# Patient Record
Sex: FEMALE | Race: WHITE | NOT HISPANIC OR LATINO
[De-identification: names, ages, dates, MRNs, and addresses within clinical notes are randomized per-mention and may not be internally consistent; named-entity substitution may affect disease eponyms.]

---

## 2020-08-28 ENCOUNTER — APPOINTMENT (OUTPATIENT)
Dept: ORTHOPEDIC SURGERY | Facility: CLINIC | Age: 75
End: 2020-08-28
Payer: MEDICARE

## 2020-08-28 DIAGNOSIS — Z78.9 OTHER SPECIFIED HEALTH STATUS: ICD-10-CM

## 2020-08-28 DIAGNOSIS — Z80.9 FAMILY HISTORY OF MALIGNANT NEOPLASM, UNSPECIFIED: ICD-10-CM

## 2020-08-28 DIAGNOSIS — M19.042 PRIMARY OSTEOARTHRITIS, LEFT HAND: ICD-10-CM

## 2020-08-28 DIAGNOSIS — M65.332 TRIGGER FINGER, LEFT MIDDLE FINGER: ICD-10-CM

## 2020-08-28 PROCEDURE — 99203 OFFICE O/P NEW LOW 30 MIN: CPT

## 2020-08-28 PROCEDURE — 73130 X-RAY EXAM OF HAND: CPT | Mod: LT

## 2020-08-28 RX ORDER — LEVOTHYROXINE, LIOTHYRONINE 76; 18 UG/1; UG/1
TABLET ORAL
Refills: 0 | Status: ACTIVE | COMMUNITY

## 2020-08-28 NOTE — HISTORY OF PRESENT ILLNESS
[Right] : right hand dominant [FreeTextEntry1] : She comes in today for evaluation of a left hand pain, which began 1 week ago. She reports that she noted ecchymosis along the knuckle of her left index finger, but cannot cite any injury or trauma. Currently she describes pain along the base of the fingers of her left hand, worse at the PIP joint of the left index finger. She has also developed lumps on the left thumb, index finger, little finger and palm. Her symptoms are exacerbated with use of the hand. She denies any clicking of her fingers. She rates her pain a 4-5 out of 10 at this time. \par \par She is status post left index finger surgery for a laceration 20 years ago.

## 2020-08-28 NOTE — PHYSICAL EXAM
[de-identified] : - Constitutional: This is a female in no obvious distress.  \par - Psych: Patient is alert and oriented to person, place and time.  Patient has a normal mood and affect.\par - Cardiovascular: Normal pulses throughout the upper extremities.  No significant varicosities are noted in the upper extremities. \par - Neuro: Strength and sensation are intact throughout the upper extremities.  Patient has normal coordination.\par - Respiratory:  Patient exhibits no evidence of shortness of breath or difficulty breathing.\par - Skin: No rashes, lesions, or other abnormalities are noted in the upper extremities.\par \par ---\par  \par Examination of her left hand demonstrates mild ecchymosis at the index finger dorsally.  There is pain with terminal flexion.  There is a well-healed scar where she had previous surgery from a laceration in the past overlying the MCP joint.  She has mild tenderness along the dorsal aspect of the PIP joint and proximal phalanx.  There is no evidence of a trigger finger.  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  She also has mild swelling with associated tenderness along the A1 pulley of the middle finger without triggering.  She is neurovascularly intact distally. [de-identified] : AP, lateral oblique radiographs of her left hand demonstrate arthritis at the DIP joints of the digits, most notably at the little finger.  She also has what appears to be a bone infarct/island along the ulnar aspect of the index finger distal phalanx.  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

## 2020-08-28 NOTE — ADDENDUM
[FreeTextEntry1] : I, Darlin San, acted solely as a scribe for Dr. Almazan on this date 08/28/2020.

## 2020-08-28 NOTE — END OF VISIT
[FreeTextEntry3] : This note was written by Darlin San on 08/28/2020 acting solely as a scribe for Dr. Edward Almazan.\par  \par All medical record entries made by the Scribe were at my, Dr. Edward Almazan, direction and personally dictated by me on 08/28/2020. I have personally reviewed the chart and agree that the record accurately reflects my personal performance of the history, physical exam, assessment and plan.

## 2020-08-28 NOTE — DISCUSSION/SUMMARY
[FreeTextEntry1] : She has findings consistent with resolving left index finger swelling and ecchymosis for the past week, possibly secondary to an old injury.  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 At this time, I recommended treatment with ice and antiinflammatories. We discussed possible cortisone injection for the left middle finger flexor tendinitis/trigger finger, but she deferred as she is not having nay significant pain or discomfort. She will return to the office if her symptoms worsen in the future. \par \par The patient has agreed to this plan of management and has expressed full understanding.  All questions were fully answered to the patient's satisfaction.\par \par Over 50% of the time spent with the patient was on counseling the patient on the above diagnosis, treatment plan and prognosis.

## 2023-07-31 ENCOUNTER — INPATIENT (INPATIENT)
Facility: HOSPITAL | Age: 78
LOS: 1 days | Discharge: HOME | End: 2023-08-02
Attending: INTERNAL MEDICINE | Admitting: INTERNAL MEDICINE
Payer: MEDICARE

## 2023-07-31 VITALS
TEMPERATURE: 98 F | HEART RATE: 87 BPM | RESPIRATION RATE: 17 BRPM | OXYGEN SATURATION: 99 % | DIASTOLIC BLOOD PRESSURE: 94 MMHG | HEIGHT: 65 IN | SYSTOLIC BLOOD PRESSURE: 178 MMHG | WEIGHT: 205.03 LBS

## 2023-07-31 DIAGNOSIS — I16.0 HYPERTENSIVE URGENCY: ICD-10-CM

## 2023-07-31 DIAGNOSIS — R42 DIZZINESS AND GIDDINESS: ICD-10-CM

## 2023-07-31 DIAGNOSIS — Z98.890 OTHER SPECIFIED POSTPROCEDURAL STATES: Chronic | ICD-10-CM

## 2023-07-31 DIAGNOSIS — E03.9 HYPOTHYROIDISM, UNSPECIFIED: ICD-10-CM

## 2023-07-31 LAB
ALBUMIN SERPL ELPH-MCNC: 3.4 G/DL — SIGNIFICANT CHANGE UP (ref 3.3–5)
ALP SERPL-CCNC: 79 U/L — SIGNIFICANT CHANGE UP (ref 40–120)
ALT FLD-CCNC: 15 U/L — SIGNIFICANT CHANGE UP (ref 12–78)
ANION GAP SERPL CALC-SCNC: 5 MMOL/L — SIGNIFICANT CHANGE UP (ref 5–17)
AST SERPL-CCNC: 13 U/L — LOW (ref 15–37)
BASOPHILS # BLD AUTO: 0.05 K/UL — SIGNIFICANT CHANGE UP (ref 0–0.2)
BASOPHILS NFR BLD AUTO: 0.6 % — SIGNIFICANT CHANGE UP (ref 0–2)
BILIRUB SERPL-MCNC: 0.4 MG/DL — SIGNIFICANT CHANGE UP (ref 0.2–1.2)
BUN SERPL-MCNC: 18 MG/DL — SIGNIFICANT CHANGE UP (ref 7–23)
CALCIUM SERPL-MCNC: 8.4 MG/DL — LOW (ref 8.5–10.1)
CHLORIDE SERPL-SCNC: 108 MMOL/L — SIGNIFICANT CHANGE UP (ref 96–108)
CO2 SERPL-SCNC: 28 MMOL/L — SIGNIFICANT CHANGE UP (ref 22–31)
CREAT SERPL-MCNC: 0.96 MG/DL — SIGNIFICANT CHANGE UP (ref 0.5–1.3)
EGFR: 61 ML/MIN/1.73M2 — SIGNIFICANT CHANGE UP
EOSINOPHIL # BLD AUTO: 0.06 K/UL — SIGNIFICANT CHANGE UP (ref 0–0.5)
EOSINOPHIL NFR BLD AUTO: 0.7 % — SIGNIFICANT CHANGE UP (ref 0–6)
GLUCOSE SERPL-MCNC: 91 MG/DL — SIGNIFICANT CHANGE UP (ref 70–99)
HCT VFR BLD CALC: 42.1 % — SIGNIFICANT CHANGE UP (ref 34.5–45)
HGB BLD-MCNC: 13.8 G/DL — SIGNIFICANT CHANGE UP (ref 11.5–15.5)
IMM GRANULOCYTES NFR BLD AUTO: 0.4 % — SIGNIFICANT CHANGE UP (ref 0–0.9)
LYMPHOCYTES # BLD AUTO: 1.55 K/UL — SIGNIFICANT CHANGE UP (ref 1–3.3)
LYMPHOCYTES # BLD AUTO: 18.8 % — SIGNIFICANT CHANGE UP (ref 13–44)
MCHC RBC-ENTMCNC: 29.1 PG — SIGNIFICANT CHANGE UP (ref 27–34)
MCHC RBC-ENTMCNC: 32.8 G/DL — SIGNIFICANT CHANGE UP (ref 32–36)
MCV RBC AUTO: 88.6 FL — SIGNIFICANT CHANGE UP (ref 80–100)
MONOCYTES # BLD AUTO: 0.51 K/UL — SIGNIFICANT CHANGE UP (ref 0–0.9)
MONOCYTES NFR BLD AUTO: 6.2 % — SIGNIFICANT CHANGE UP (ref 2–14)
NEUTROPHILS # BLD AUTO: 6.04 K/UL — SIGNIFICANT CHANGE UP (ref 1.8–7.4)
NEUTROPHILS NFR BLD AUTO: 73.3 % — SIGNIFICANT CHANGE UP (ref 43–77)
NRBC # BLD: 0 /100 WBCS — SIGNIFICANT CHANGE UP (ref 0–0)
PLATELET # BLD AUTO: 198 K/UL — SIGNIFICANT CHANGE UP (ref 150–400)
POTASSIUM SERPL-MCNC: 3.5 MMOL/L — SIGNIFICANT CHANGE UP (ref 3.5–5.3)
POTASSIUM SERPL-SCNC: 3.5 MMOL/L — SIGNIFICANT CHANGE UP (ref 3.5–5.3)
PROT SERPL-MCNC: 7.1 GM/DL — SIGNIFICANT CHANGE UP (ref 6–8.3)
RBC # BLD: 4.75 M/UL — SIGNIFICANT CHANGE UP (ref 3.8–5.2)
RBC # FLD: 13.3 % — SIGNIFICANT CHANGE UP (ref 10.3–14.5)
SODIUM SERPL-SCNC: 141 MMOL/L — SIGNIFICANT CHANGE UP (ref 135–145)
TROPONIN I, HIGH SENSITIVITY RESULT: 6.2 NG/L — SIGNIFICANT CHANGE UP
TSH SERPL-MCNC: 1.21 UIU/ML — SIGNIFICANT CHANGE UP (ref 0.36–3.74)
WBC # BLD: 8.24 K/UL — SIGNIFICANT CHANGE UP (ref 3.8–10.5)
WBC # FLD AUTO: 8.24 K/UL — SIGNIFICANT CHANGE UP (ref 3.8–10.5)

## 2023-07-31 PROCEDURE — 70450 CT HEAD/BRAIN W/O DYE: CPT | Mod: 26,MA

## 2023-07-31 PROCEDURE — 99285 EMERGENCY DEPT VISIT HI MDM: CPT | Mod: FS

## 2023-07-31 PROCEDURE — 93010 ELECTROCARDIOGRAM REPORT: CPT

## 2023-07-31 PROCEDURE — 99223 1ST HOSP IP/OBS HIGH 75: CPT

## 2023-07-31 PROCEDURE — 71045 X-RAY EXAM CHEST 1 VIEW: CPT | Mod: 26

## 2023-07-31 RX ORDER — ONDANSETRON 8 MG/1
4 TABLET, FILM COATED ORAL EVERY 8 HOURS
Refills: 0 | Status: DISCONTINUED | OUTPATIENT
Start: 2023-07-31 | End: 2023-08-02

## 2023-07-31 RX ORDER — HYDRALAZINE HCL 50 MG
10 TABLET ORAL ONCE
Refills: 0 | Status: COMPLETED | OUTPATIENT
Start: 2023-07-31 | End: 2023-07-31

## 2023-07-31 RX ORDER — MECLIZINE HCL 12.5 MG
25 TABLET ORAL EVERY 6 HOURS
Refills: 0 | Status: DISCONTINUED | OUTPATIENT
Start: 2023-07-31 | End: 2023-08-02

## 2023-07-31 RX ORDER — HYDRALAZINE HCL 50 MG
10 TABLET ORAL EVERY 8 HOURS
Refills: 0 | Status: DISCONTINUED | OUTPATIENT
Start: 2023-07-31 | End: 2023-08-01

## 2023-07-31 RX ORDER — HYDRALAZINE HCL 50 MG
5 TABLET ORAL ONCE
Refills: 0 | Status: COMPLETED | OUTPATIENT
Start: 2023-07-31 | End: 2023-07-31

## 2023-07-31 RX ORDER — ACETAMINOPHEN 500 MG
975 TABLET ORAL ONCE
Refills: 0 | Status: COMPLETED | OUTPATIENT
Start: 2023-07-31 | End: 2023-07-31

## 2023-07-31 RX ORDER — MECLIZINE HCL 12.5 MG
25 TABLET ORAL ONCE
Refills: 0 | Status: COMPLETED | OUTPATIENT
Start: 2023-07-31 | End: 2023-07-31

## 2023-07-31 RX ORDER — LEVOTHYROXINE SODIUM 125 MCG
100 TABLET ORAL DAILY
Refills: 0 | Status: DISCONTINUED | OUTPATIENT
Start: 2023-07-31 | End: 2023-08-02

## 2023-07-31 RX ORDER — SODIUM CHLORIDE 9 MG/ML
1000 INJECTION INTRAMUSCULAR; INTRAVENOUS; SUBCUTANEOUS ONCE
Refills: 0 | Status: COMPLETED | OUTPATIENT
Start: 2023-07-31 | End: 2023-07-31

## 2023-07-31 RX ORDER — METOCLOPRAMIDE HCL 10 MG
10 TABLET ORAL ONCE
Refills: 0 | Status: COMPLETED | OUTPATIENT
Start: 2023-07-31 | End: 2023-07-31

## 2023-07-31 RX ORDER — ACETAMINOPHEN 500 MG
650 TABLET ORAL EVERY 6 HOURS
Refills: 0 | Status: DISCONTINUED | OUTPATIENT
Start: 2023-07-31 | End: 2023-08-02

## 2023-07-31 RX ORDER — LANOLIN ALCOHOL/MO/W.PET/CERES
3 CREAM (GRAM) TOPICAL AT BEDTIME
Refills: 0 | Status: DISCONTINUED | OUTPATIENT
Start: 2023-07-31 | End: 2023-08-02

## 2023-07-31 RX ADMIN — Medication 975 MILLIGRAM(S): at 19:36

## 2023-07-31 RX ADMIN — Medication 5 MILLIGRAM(S): at 20:17

## 2023-07-31 RX ADMIN — Medication 975 MILLIGRAM(S): at 20:05

## 2023-07-31 RX ADMIN — SODIUM CHLORIDE 1000 MILLILITER(S): 9 INJECTION INTRAMUSCULAR; INTRAVENOUS; SUBCUTANEOUS at 20:05

## 2023-07-31 RX ADMIN — SODIUM CHLORIDE 2000 MILLILITER(S): 9 INJECTION INTRAMUSCULAR; INTRAVENOUS; SUBCUTANEOUS at 19:35

## 2023-07-31 RX ADMIN — Medication 10 MILLIGRAM(S): at 21:14

## 2023-07-31 RX ADMIN — Medication 25 MILLIGRAM(S): at 19:36

## 2023-07-31 NOTE — H&P ADULT - HISTORY OF PRESENT ILLNESS
78 year old female with a PMH Hypothyroidism presents to ED with a 2 day history of dizziness, headache describes  as " feels like a balloon", neck pain that has gotten worst today. Endorses nausea, denies Vomiting.  No recent head trauma/injury or falls. Denies Vison changes, extremity weakness numbness,   fever, chills, chest pain, shortness of breath, abdominal pain, diarrhea, dysuria, sick contact. In the ED patient was found to be in hypertensive urgency with SBP>200, responded to hydralazine. Labs unremarkable.   CT-Head: Age-appropriate involutional changes. No acute intracranial pathology. If dizziness persists, consider further evaluation via MR imaging to include DWI and ADC mapping techniques, provided there are no contraindications.

## 2023-07-31 NOTE — ED ADULT NURSE NOTE - NSFALLRISKINTERV_ED_ALL_ED

## 2023-07-31 NOTE — ED ADULT TRIAGE NOTE - CHIEF COMPLAINT QUOTE
BIBA from home for dizziness, headache and hypertension, 180/90, no hx of htn, patient states has been feeling lousy x 2 days, states head feels like a bowling ball, eyes feels weird

## 2023-07-31 NOTE — ED PROVIDER NOTE - CLINICAL SUMMARY MEDICAL DECISION MAKING FREE TEXT BOX
78F with PMH Hypothyroidism who presents to ED with room-spinning sensation/dizziness and headache x 2 days. Patient currently afebrile, hemodynamically stable, spO2 100%. Based on history and physical, differentials include but are not limited to . Plan to assess patient for acute pathology as listed above with . Will administer medications for symptomatic relief, follow-up on results, and reassess. 78F with PMH Hypothyroidism who presents to ED with room-spinning sensation/dizziness and headache x 2 days. Patient currently afebrile, hemodynamically stable but hypertensive, spO2 100%. Based on history and physical, differentials include but are not limited to viral illness, electrolyte abnormality, anemia, ACS, ICH. Plan to assess patient for acute pathology as listed above with labs, EKG, CXR CT Head. Will administer medications for symptomatic relief, follow-up on results, and reassess.

## 2023-07-31 NOTE — ED ADULT NURSE REASSESSMENT NOTE - NS ED NURSE REASSESS COMMENT FT1
Pt says she feels cold and shaky after hydralazine, says she has reactions to most medications. VS within normal range, endorsed to Dr. Gomez. Cardiac and SPO2 monitoring in place. Continuing to monitor closely.

## 2023-07-31 NOTE — H&P ADULT - ASSESSMENT
78 year old female with a PMH Hypothyroidism presents to ED with a 2 day history of dizziness, headache describes  as " feels like a balloon", neck pain that has gotten worst today. Endorses nausea, denies Vomiting.  No recent head trauma/injury or falls. Denies Vison changes, extremity weakness numbness,   fever, chills, chest pain, shortness of breath, abdominal pain, diarrhea, dysuria, sick contact. In the ED patient was found to be in hypertensive urgency with SBP>200, responded to hydralazine. Labs unremarkable.   CT-Head: Age-appropriate involutional changes. No acute intracranial pathology. If dizziness persists, consider further evaluation via MR imaging to include DWI and ADC mapping techniques, provided there are no contraindications.  Patient is being admitted for new onset hypertensive urgency

## 2023-07-31 NOTE — H&P ADULT - NSHPPHYSICALEXAM_GEN_ALL_CORE
Constitutional:  Well developed, well nourished, no acute distress .    Eyes:  normal conjunctiva.   Neck:  no carotid bruit, No JVD  Cardiac: RRR,  no murmur, no rub, no gallop,   Pulmonary:  clear lung fields, good air entry, no respiratory distress.   Abdomen:  +BS, soft, non-tender, no masses/organomegaly, normal bowel sounds.   Extremities:  no cyanosis, no clubbing, no varicosities,   Skin:  no rash, no skin lesions.   Neurological:  moves all extremities, no focal deficits, normal speech.   Psychiatric:  alert and oriented, normal memory.

## 2023-07-31 NOTE — ED ADULT NURSE NOTE - OBJECTIVE STATEMENT
Pt aaox4, bibems from home c/o headache, room-spinning sensation x2 days. Pt also c/o sys bp in the 190s over the past few days which she believes is causing her headache. Pt denies trauma/injury. Pt denies cp, sob, blurry vision, fever/chills, cough, congestion, n/v/d/c, abdominal pain, back pain, flank pain, urinary s/s, numbness/tingling, weakness. Stat ct head completed. Respirations even and unlabored. No acute distress noted at this time.

## 2023-07-31 NOTE — ED PROVIDER NOTE - OBJECTIVE STATEMENT
78F with PMH Hypothyroidism who presents to ED with room-spinning sensation/dizziness and headache x 2 days. No recent head trauma/injury or falls. Denies fever, chills, chest pain, shortness of breath, abdominal pain, N/V/D, dysuria, urinary frequency/urgency, extremity weakness/numbness/tingling, vision changes, or neck pain/stiffness.

## 2023-07-31 NOTE — H&P ADULT - NSHPLABSRESULTS_GEN_ALL_CORE
13.8   8.24  )-----------( 198      ( 31 Jul 2023 19:53 )             42.1     07-31    141  |  108  |  18  ----------------------------<  91  3.5   |  28  |  0.96    Ca    8.4<L>      31 Jul 2023 19:53    TPro  7.1  /  Alb  3.4  /  TBili  0.4  /  DBili  x   /  AST  13<L>  /  ALT  15  /  AlkPhos  79  07-31      Urinalysis Basic - ( 31 Jul 2023 19:53 )    Color: x / Appearance: x / SG: x / pH: x  Gluc: 91 mg/dL / Ketone: x  / Bili: x / Urobili: x   Blood: x / Protein: x / Nitrite: x   Leuk Esterase: x / RBC: x / WBC x   Sq Epi: x / Non Sq Epi: x / Bacteria: x              CT-Head: Age-appropriate involutional changes. No acute intracranial pathology. If dizziness persists, consider further evaluation via MR imaging to include DWI and ADC mapping techniques, provided there are no contraindications.

## 2023-07-31 NOTE — H&P ADULT - PROBLEM SELECTOR PLAN 2
2 days history of dizziness & headache   CT-Head: Age-appropriate involutional changes. No acute intracranial pathology. If dizziness persists, consider further evaluation via MR imaging to include DWI and ADC mapping techniques, provided there are no contraindications.  - Meclizine  - Tylenol  - Monitor

## 2023-07-31 NOTE — H&P ADULT - PROBLEM SELECTOR PLAN 1
New onset Hypertension   Upon ED arrival / 90 then increased to SBP >200  Received Hydralazine 5mg + 10mg   Patient is reluctant to medications - endorses she "gets reaction" to all medication   - Telemetry   - Hydralazine as needed to manage BP   - Cardio consult   - ECG  - Orthostatic BP   - Monitor BP

## 2023-07-31 NOTE — ED ADULT NURSE REASSESSMENT NOTE - NS ED NURSE REASSESS COMMENT FT1
Spoke with pt's  Edward Saleh (([phone]), advised him of plan of care, requests to be notified with updates.

## 2023-08-01 LAB
A1C WITH ESTIMATED AVERAGE GLUCOSE RESULT: 5.6 % — SIGNIFICANT CHANGE UP (ref 4–5.6)
ALBUMIN SERPL ELPH-MCNC: 3.2 G/DL — LOW (ref 3.3–5)
ALP SERPL-CCNC: 80 U/L — SIGNIFICANT CHANGE UP (ref 40–120)
ALT FLD-CCNC: 13 U/L — SIGNIFICANT CHANGE UP (ref 12–78)
ANION GAP SERPL CALC-SCNC: 7 MMOL/L — SIGNIFICANT CHANGE UP (ref 5–17)
AST SERPL-CCNC: 12 U/L — LOW (ref 15–37)
BASOPHILS # BLD AUTO: 0.04 K/UL — SIGNIFICANT CHANGE UP (ref 0–0.2)
BASOPHILS NFR BLD AUTO: 0.4 % — SIGNIFICANT CHANGE UP (ref 0–2)
BILIRUB SERPL-MCNC: 0.5 MG/DL — SIGNIFICANT CHANGE UP (ref 0.2–1.2)
BUN SERPL-MCNC: 14 MG/DL — SIGNIFICANT CHANGE UP (ref 7–23)
CALCIUM SERPL-MCNC: 7.9 MG/DL — LOW (ref 8.5–10.1)
CHLORIDE SERPL-SCNC: 111 MMOL/L — HIGH (ref 96–108)
CHOLEST SERPL-MCNC: 180 MG/DL — SIGNIFICANT CHANGE UP
CO2 SERPL-SCNC: 22 MMOL/L — SIGNIFICANT CHANGE UP (ref 22–31)
CREAT SERPL-MCNC: 0.71 MG/DL — SIGNIFICANT CHANGE UP (ref 0.5–1.3)
EGFR: 88 ML/MIN/1.73M2 — SIGNIFICANT CHANGE UP
EOSINOPHIL # BLD AUTO: 0 K/UL — SIGNIFICANT CHANGE UP (ref 0–0.5)
EOSINOPHIL NFR BLD AUTO: 0 % — SIGNIFICANT CHANGE UP (ref 0–6)
ESTIMATED AVERAGE GLUCOSE: 114 MG/DL — SIGNIFICANT CHANGE UP (ref 68–114)
GLUCOSE SERPL-MCNC: 113 MG/DL — HIGH (ref 70–99)
HCT VFR BLD CALC: 41.5 % — SIGNIFICANT CHANGE UP (ref 34.5–45)
HCV AB S/CO SERPL IA: 0.12 S/CO — SIGNIFICANT CHANGE UP (ref 0–0.99)
HCV AB SERPL-IMP: SIGNIFICANT CHANGE UP
HDLC SERPL-MCNC: 71 MG/DL — SIGNIFICANT CHANGE UP
HGB BLD-MCNC: 13.7 G/DL — SIGNIFICANT CHANGE UP (ref 11.5–15.5)
IMM GRANULOCYTES NFR BLD AUTO: 0.4 % — SIGNIFICANT CHANGE UP (ref 0–0.9)
LIPID PNL WITH DIRECT LDL SERPL: 98 MG/DL — SIGNIFICANT CHANGE UP
LYMPHOCYTES # BLD AUTO: 1.15 K/UL — SIGNIFICANT CHANGE UP (ref 1–3.3)
LYMPHOCYTES # BLD AUTO: 11.3 % — LOW (ref 13–44)
MCHC RBC-ENTMCNC: 29.1 PG — SIGNIFICANT CHANGE UP (ref 27–34)
MCHC RBC-ENTMCNC: 33 G/DL — SIGNIFICANT CHANGE UP (ref 32–36)
MCV RBC AUTO: 88.1 FL — SIGNIFICANT CHANGE UP (ref 80–100)
MONOCYTES # BLD AUTO: 0.33 K/UL — SIGNIFICANT CHANGE UP (ref 0–0.9)
MONOCYTES NFR BLD AUTO: 3.2 % — SIGNIFICANT CHANGE UP (ref 2–14)
NEUTROPHILS # BLD AUTO: 8.66 K/UL — HIGH (ref 1.8–7.4)
NEUTROPHILS NFR BLD AUTO: 84.7 % — HIGH (ref 43–77)
NON HDL CHOLESTEROL: 109 MG/DL — SIGNIFICANT CHANGE UP
NRBC # BLD: 0 /100 WBCS — SIGNIFICANT CHANGE UP (ref 0–0)
PLATELET # BLD AUTO: 200 K/UL — SIGNIFICANT CHANGE UP (ref 150–400)
POTASSIUM SERPL-MCNC: 3.7 MMOL/L — SIGNIFICANT CHANGE UP (ref 3.5–5.3)
POTASSIUM SERPL-SCNC: 3.7 MMOL/L — SIGNIFICANT CHANGE UP (ref 3.5–5.3)
PROT SERPL-MCNC: 6.9 GM/DL — SIGNIFICANT CHANGE UP (ref 6–8.3)
RBC # BLD: 4.71 M/UL — SIGNIFICANT CHANGE UP (ref 3.8–5.2)
RBC # FLD: 13.3 % — SIGNIFICANT CHANGE UP (ref 10.3–14.5)
SODIUM SERPL-SCNC: 140 MMOL/L — SIGNIFICANT CHANGE UP (ref 135–145)
T4 AB SER-ACNC: 10.3 UG/DL — SIGNIFICANT CHANGE UP (ref 4.6–12)
TRIGL SERPL-MCNC: 58 MG/DL — SIGNIFICANT CHANGE UP
WBC # BLD: 10.22 K/UL — SIGNIFICANT CHANGE UP (ref 3.8–10.5)
WBC # FLD AUTO: 10.22 K/UL — SIGNIFICANT CHANGE UP (ref 3.8–10.5)

## 2023-08-01 PROCEDURE — 99232 SBSQ HOSP IP/OBS MODERATE 35: CPT

## 2023-08-01 PROCEDURE — 93010 ELECTROCARDIOGRAM REPORT: CPT

## 2023-08-01 PROCEDURE — 99223 1ST HOSP IP/OBS HIGH 75: CPT

## 2023-08-01 RX ORDER — HYDRALAZINE HCL 50 MG
5 TABLET ORAL ONCE
Refills: 0 | Status: COMPLETED | OUTPATIENT
Start: 2023-08-01 | End: 2023-08-01

## 2023-08-01 RX ORDER — LOSARTAN POTASSIUM 100 MG/1
25 TABLET, FILM COATED ORAL DAILY
Refills: 0 | Status: DISCONTINUED | OUTPATIENT
Start: 2023-08-01 | End: 2023-08-02

## 2023-08-01 RX ORDER — IBUPROFEN 200 MG
400 TABLET ORAL THREE TIMES A DAY
Refills: 0 | Status: DISCONTINUED | OUTPATIENT
Start: 2023-08-01 | End: 2023-08-02

## 2023-08-01 RX ADMIN — Medication 5 MILLIGRAM(S): at 01:06

## 2023-08-01 RX ADMIN — Medication 400 MILLIGRAM(S): at 11:15

## 2023-08-01 RX ADMIN — Medication 10 MILLIGRAM(S): at 06:01

## 2023-08-01 RX ADMIN — Medication 400 MILLIGRAM(S): at 12:15

## 2023-08-01 RX ADMIN — Medication 100 MICROGRAM(S): at 06:00

## 2023-08-01 NOTE — PROGRESS NOTE ADULT - PROBLEM SELECTOR PLAN 3
Chronic - resent Thyroid function WNL   - Synthroid 100mcg    Dispo: in AM pending monitoring of BP Chronic - resent Thyroid function WNL   - Synthroid 100mcg    Dispo: in AM pending monitoring of BP and TTE

## 2023-08-01 NOTE — PROGRESS NOTE ADULT - PROBLEM SELECTOR PLAN 2
2 days history of dizziness & headache   CT-Head: Age-appropriate involutional changes. No acute intracranial pathology. If dizziness persists, consider further evaluation via MR imaging to include DWI and ADC mapping techniques, provided there are no contraindications.  - Meclizine  - Tylenol  - Add Ibuprofen PRN  - Monitor 2 days history of dizziness & headache   CT-Head: Age-appropriate involutional changes. No acute intracranial pathology. If dizziness persists, consider further evaluation via MR imaging to include DWI and ADC mapping techniques, provided there are no contraindications.  - Meclizine  - Tylenol  - Add Ibuprofen PRN  - Monitor  - TTE

## 2023-08-01 NOTE — PROGRESS NOTE ADULT - PROBLEM SELECTOR PLAN 1
New onset Hypertension   Upon ED arrival / 90 then increased to SBP >200  Received Hydralazine 5mg + 10mg   Patient is reluctant to medications - endorses she "gets reaction" to all medication   - Telemetry   - Hydralazine as needed to manage BP   - ECG without hypertrophy  - Orthostatic BP   - Monitor BP New onset Hypertension   Upon ED arrival / 90 then increased to SBP >200  Received Hydralazine 5mg + 10mg   Patient is reluctant to medications - endorses she "gets reaction" to all medication   - Telemetry   - Hydralazine as needed to manage BP, BP now controlled off meds, ?anxiety driven   - ECG without hypertrophy  - TTE  - Orthostatic BP   - Monitor BP New onset Hypertension   Upon ED arrival / 90 then increased to SBP >200  Received Hydralazine 5mg + 10mg   Patient is reluctant to medications - endorses she "gets reaction" to all medication and refused PO BP meds this AM  - Telemetry   - Hydralazine as needed to manage BP, BP now controlled off meds, ?anxiety driven   - Cards  - ECG without hypertrophy  - TTE  - Orthostatic BP   - Monitor BP

## 2023-08-01 NOTE — CONSULT NOTE ADULT - ASSESSMENT
78 year old female with a PMH Hypothyroidism; presented to ED with a 2 day history of dizziness, HA, nausea.  In the ED patient was found to be hypertensive with SBP>200, responded to hydralazine.   Labs unremarkable.   CT-Head: Age-appropriate involutional changes. No acute intracranial pathology.   No hx of HTN... usually 110-120s.  Similar presentation a few years ago... normal stress test/echo; ?stress related.  EKG: sinus 64bpm  On hydralazine 10mg IV q8    -monitor on tele  -?why still on IV medications... would d/c  -will start losartan  -2D echo pending

## 2023-08-01 NOTE — PATIENT PROFILE ADULT - FALL HARM RISK - RISK INTERVENTIONS
Assistance OOB with selected safe patient handling equipment/Assistance with ambulation/Communicate Fall Risk and Risk Factors to all staff, patient, and family/Monitor gait and stability/Reinforce activity limits and safety measures with patient and family/Sit up slowly, dangle for a short time, stand at bedside before walking/Use of alarms - bed, chair and/or voice tab/Visual Cue: Yellow wristband/Bed in lowest position, wheels locked, appropriate side rails in place/Call bell, personal items and telephone in reach/Instruct patient to call for assistance before getting out of bed or chair/Non-slip footwear when patient is out of bed/[place] to call system/Physically safe environment - no spills, clutter or unnecessary equipment/Purposeful Proactive Rounding/Room/bathroom lighting operational, light cord in reach

## 2023-08-01 NOTE — PROGRESS NOTE ADULT - SUBJECTIVE AND OBJECTIVE BOX
Zabrina Ricci M.D.    Patient is a 78y old  Female who presents with a chief complaint of Hypertensive Urgency (31 Jul 2023 22:54)      SUBJECTIVE / OVERNIGHT EVENTS: still have some headache behind the eyes. No other concerns. No h/o elevated BP. But dose reports some anxiety as she is due for eye surgery later this month.     Patient denies chest pain, SOB, abd pain, N/V, fever, chills, dysuria or any other complaints. All remainder ROS negative.     MEDICATIONS  (STANDING):  hydrALAZINE Injectable 10 milliGRAM(s) IV Push every 8 hours  levothyroxine 100 MICROGram(s) Oral daily    MEDICATIONS  (PRN):  acetaminophen     Tablet .. 650 milliGRAM(s) Oral every 6 hours PRN Temp greater or equal to 38C (100.4F), Mild Pain (1 - 3)  ibuprofen  Tablet. 400 milliGRAM(s) Oral three times a day PRN Mild Pain (1 - 3), Moderate Pain (4 - 6)  meclizine 25 milliGRAM(s) Oral every 6 hours PRN Dizziness  melatonin 3 milliGRAM(s) Oral at bedtime PRN Insomnia  ondansetron Injectable 4 milliGRAM(s) IV Push every 8 hours PRN Nausea and/or Vomiting      I&O's Summary      PHYSICAL EXAM:  Vital Signs Last 24 Hrs  T(C): 36.4 (01 Aug 2023 10:50), Max: 36.9 (31 Jul 2023 19:01)  T(F): 97.6 (01 Aug 2023 10:50), Max: 98.4 (31 Jul 2023 19:01)  HR: 81 (01 Aug 2023 10:50) (63 - 90)  BP: 138/83 (01 Aug 2023 10:50) (136/81 - 214/84)  BP(mean): --  RR: 17 (01 Aug 2023 10:50) (11 - 18)  SpO2: 100% (01 Aug 2023 10:50) (98% - 100%)    Parameters below as of 01 Aug 2023 10:50  Patient On (Oxygen Delivery Method): room air    CONSTITUTIONAL: NAD, well-groomed  RESPIRATORY: Normal respiratory effort; lungs are clear to auscultation bilaterally  CARDIOVASCULAR: Regular rate and rhythm; No lower extremity edema  ABDOMEN: Nontender to palpation, normoactive bowel sounds  PSYCH: A+O x3; affect appropriate  NEUROLOGY: CN 2-12 are intact and symmetric; no gross sensory deficits;   SKIN: No rashes; no palpable lesions    LABS:                        13.7   10.22 )-----------( 200      ( 01 Aug 2023 06:26 )             41.5     08-01    140  |  111<H>  |  14  ----------------------------<  113<H>  3.7   |  22  |  0.71    Ca    7.9<L>      01 Aug 2023 06:26    TPro  6.9  /  Alb  3.2<L>  /  TBili  0.5  /  DBili  x   /  AST  12<L>  /  ALT  13  /  AlkPhos  80  08-01          Urinalysis Basic - ( 01 Aug 2023 06:26 )    Color: x / Appearance: x / SG: x / pH: x  Gluc: 113 mg/dL / Ketone: x  / Bili: x / Urobili: x   Blood: x / Protein: x / Nitrite: x   Leuk Esterase: x / RBC: x / WBC x   Sq Epi: x / Non Sq Epi: x / Bacteria: x        CAPILLARY BLOOD GLUCOSE          RADIOLOGY & ADDITIONAL TESTS:  Results Reviewed:   Imaging Personally Reviewed:  Electrocardiogram Personally Reviewed:

## 2023-08-01 NOTE — CONSULT NOTE ADULT - SUBJECTIVE AND OBJECTIVE BOX
CARDIOLOGY CONSULT NOTE    Patient is a 78y Female with a known history of :  Hypertensive urgency [I16.0]    Dizziness [R42]    Hypothyroidism [E03.9]      HPI:  78 year old female with a PMH Hypothyroidism; presented to ED with a 2 day history of dizziness, HA, nausea.  In the ED patient was found to be hypertensive with SBP>200, responded to hydralazine.   Labs unremarkable.   CT-Head: Age-appropriate involutional changes. No acute intracranial pathology.   No hx of HTN... usually 110-120s.  Similar presentation a few years ago... normal stress test/echo; ?stress related.  EKG: sinus 64bpm      REVIEW OF SYSTEMS:  CONSTITUTIONAL: No fever, weight loss, or fatigue  EYES: No eye pain, visual disturbances, or discharge  ENMT:  No difficulty hearing, tinnitus, vertigo; No sinus or throat pain  NECK: No pain or stiffness  BREASTS: No pain, masses, or nipple discharge  RESPIRATORY: No cough, wheezing, chills or hemoptysis; No shortness of breath  CARDIOVASCULAR: No chest pain, palpitations, dizziness, or leg swelling  GASTROINTESTINAL: No abdominal or epigastric pain. No nausea, vomiting, or hematemesis; No diarrhea or constipation. No melena or hematochezia.  GENITOURINARY: No dysuria, frequency, hematuria, or incontinence  NEUROLOGICAL: No headaches, memory loss, loss of strength, numbness, or tremors  SKIN: No itching, burning, rashes, or lesions   LYMPH NODES: No enlarged glands  ENDOCRINE: No heat or cold intolerance; No hair loss  MUSCULOSKELETAL: No joint pain or swelling; No muscle, back, or extremity pain  PSYCHIATRIC: No depression, anxiety, mood swings, or difficulty sleeping  HEME/LYMPH: No easy bruising, or bleeding gums  ALLERGY AND IMMUNOLOGIC: No hives or eczema    MEDICATIONS  (STANDING):  hydrALAZINE Injectable 10 milliGRAM(s) IV Push every 8 hours  levothyroxine 100 MICROGram(s) Oral daily    MEDICATIONS  (PRN):  acetaminophen     Tablet .. 650 milliGRAM(s) Oral every 6 hours PRN Temp greater or equal to 38C (100.4F), Mild Pain (1 - 3)  ibuprofen  Tablet. 400 milliGRAM(s) Oral three times a day PRN Mild Pain (1 - 3), Moderate Pain (4 - 6)  meclizine 25 milliGRAM(s) Oral every 6 hours PRN Dizziness  melatonin 3 milliGRAM(s) Oral at bedtime PRN Insomnia  ondansetron Injectable 4 milliGRAM(s) IV Push every 8 hours PRN Nausea and/or Vomiting      ALLERGIES: Ceclor (Hives)      FAMILY HISTORY:      PHYSICAL EXAMINATION:  -----------------------------  T(C): 36.4 (08-01-23 @ 10:50), Max: 36.9 (07-31-23 @ 19:01)  HR: 88 (08-01-23 @ 14:52) (63 - 90)  BP: 108/69 (08-01-23 @ 14:52) (108/69 - 214/84)  RR: 17 (08-01-23 @ 10:50) (11 - 18)  SpO2: 100% (08-01-23 @ 10:50) (98% - 100%)      Constitutional: well developed, normal appearance, well groomed, well nourished, no deformities and no acute distress.   Eyes: the conjunctiva exhibited no abnormalities and the eyelids demonstrated no xanthelasmas.   HEENT: normal oral mucosa, no oral pallor and no oral cyanosis.   Neck: normal jugular venous A waves present, normal jugular venous V waves present and no jugular venous bruno A waves.   Pulmonary: no respiratory distress, normal respiratory rhythm and effort, no accessory muscle use and lungs were clear to auscultation bilaterally.   Cardiovascular: heart rate and rhythm were normal, normal S1 and S2 and no murmur, gallop, rub, heave or thrill are present.   Abdomen: soft, non-tender, no hepato-splenomegaly and no abdominal mass palpated.   Musculoskeletal: the gait could not be assessed..   Extremities: no clubbing of the fingernails, no localized cyanosis, no petechial hemorrhages and no ischemic changes.   Skin: normal skin color and pigmentation, no rash, no venous stasis, no skin lesions, no skin ulcer and no xanthoma was observed.   Psychiatric: oriented to person, place, and time, the affect was normal, the mood was normal and not feeling anxious.       LABS:   --------  08-01    140  |  111<H>  |  14  ----------------------------<  113<H>  3.7   |  22  |  0.71    Ca    7.9<L>      01 Aug 2023 06:26    TPro  6.9  /  Alb  3.2<L>  /  TBili  0.5  /  DBili  x   /  AST  12<L>  /  ALT  13  /  AlkPhos  80  08-01                         13.7   10.22 )-----------( 200      ( 01 Aug 2023 06:26 )             41.5           180 mg/dL, --, 71 mg/dL, 58 mg/dL

## 2023-08-02 ENCOUNTER — TRANSCRIPTION ENCOUNTER (OUTPATIENT)
Age: 78
End: 2023-08-02

## 2023-08-02 VITALS
TEMPERATURE: 98 F | SYSTOLIC BLOOD PRESSURE: 119 MMHG | OXYGEN SATURATION: 96 % | RESPIRATION RATE: 17 BRPM | HEART RATE: 83 BPM | DIASTOLIC BLOOD PRESSURE: 77 MMHG

## 2023-08-02 PROCEDURE — 99239 HOSP IP/OBS DSCHRG MGMT >30: CPT

## 2023-08-02 PROCEDURE — 99233 SBSQ HOSP IP/OBS HIGH 50: CPT

## 2023-08-02 RX ORDER — LEVOTHYROXINE SODIUM 125 MCG
1 TABLET ORAL
Qty: 0 | Refills: 0 | DISCHARGE
Start: 2023-08-02

## 2023-08-02 RX ORDER — CALCITRIOL 0.5 UG/1
1 CAPSULE ORAL
Refills: 0 | DISCHARGE

## 2023-08-02 RX ORDER — LOSARTAN POTASSIUM 100 MG/1
1 TABLET, FILM COATED ORAL
Qty: 30 | Refills: 0
Start: 2023-08-02 | End: 2023-08-31

## 2023-08-02 RX ORDER — LEVOTHYROXINE SODIUM 125 MCG
1 TABLET ORAL
Refills: 0 | DISCHARGE

## 2023-08-02 RX ORDER — CALCIUM CARBONATE 500(1250)
1 TABLET ORAL
Refills: 0 | DISCHARGE

## 2023-08-02 RX ADMIN — Medication 100 MICROGRAM(S): at 05:35

## 2023-08-02 RX ADMIN — LOSARTAN POTASSIUM 25 MILLIGRAM(S): 100 TABLET, FILM COATED ORAL at 05:35

## 2023-08-02 NOTE — DISCHARGE NOTE PROVIDER - NSDCCPCAREPLAN_GEN_ALL_CORE_FT
PRINCIPAL DISCHARGE DIAGNOSIS  Diagnosis: Hypertensive urgency  Assessment and Plan of Treatment: start losartan 25 daily  low salt diet  follow up with your primary doctor in the next 2 weeks     PRINCIPAL DISCHARGE DIAGNOSIS  Diagnosis: Hypertensive urgency  Assessment and Plan of Treatment: start losartan 25 daily  low salt diet  follow up with your primary doctor in the next 2 weeks      SECONDARY DISCHARGE DIAGNOSES  Diagnosis: Dizziness  Assessment and Plan of Treatment:     Diagnosis: Hypertensive urgency  Assessment and Plan of Treatment:

## 2023-08-02 NOTE — PROGRESS NOTE ADULT - SUBJECTIVE AND OBJECTIVE BOX
Patient: ARIANA CHÁVEZ [id number] 78y Female                            Hospitalist Attending Note   at bedside  Denies complaints, reports to be at baseline.    ____________________PHYSICAL EXAM:  GENERAL:  NAD Alert and Oriented x 3   HEENT: NCAT  CARDIOVASCULAR:  S1, S2  LUNGS: CTAB  ABDOMEN:  soft, (-) tenderness, (-) distension, (+) bowel sounds, (-) guarding, (-) rebound (-) rigidity  EXTREMITIES:  no cyanosis / clubbing / edema.   ____________________     VITALS:  Vital Signs Last 24 Hrs  T(C): 36.7 (02 Aug 2023 11:05), Max: 36.7 (01 Aug 2023 16:49)  T(F): 98.1 (02 Aug 2023 11:05), Max: 98.1 (01 Aug 2023 16:49)  HR: 83 (02 Aug 2023 11:05) (71 - 86)  BP: 119/77 (02 Aug 2023 11:05) (116/75 - 138/82)  BP(mean): --  RR: 17 (02 Aug 2023 11:05) (17 - 17)  SpO2: 96% (02 Aug 2023 11:05) (96% - 100%)    Parameters below as of 02 Aug 2023 11:05  Patient On (Oxygen Delivery Method): room air     Daily     Daily   CAPILLARY BLOOD GLUCOSE        I&O's Summary    01 Aug 2023 07:01  -  02 Aug 2023 07:00  --------------------------------------------------------  IN: 480 mL / OUT: 0 mL / NET: 480 mL        HISTORY:  PAST MEDICAL & SURGICAL HISTORY:  Hypothyroidism      History of thyroid surgery      Allergies    Ceclor (Hives)    Intolerances       LABS:                        13.7   10.22 )-----------( 200      ( 01 Aug 2023 06:26 )             41.5     08-01    140  |  111<H>  |  14  ----------------------------<  113<H>  3.7   |  22  |  0.71    Ca    7.9<L>      01 Aug 2023 06:26    TPro  6.9  /  Alb  3.2<L>  /  TBili  0.5  /  DBili  x   /  AST  12<L>  /  ALT  13  /  AlkPhos  80  08-01      LIVER FUNCTIONS - ( 01 Aug 2023 06:26 )  Alb: 3.2 g/dL / Pro: 6.9 gm/dL / ALK PHOS: 80 U/L / ALT: 13 U/L / AST: 12 U/L / GGT: x           Urinalysis Basic - ( 01 Aug 2023 06:26 )    Color: x / Appearance: x / SG: x / pH: x  Gluc: 113 mg/dL / Ketone: x  / Bili: x / Urobili: x   Blood: x / Protein: x / Nitrite: x   Leuk Esterase: x / RBC: x / WBC x   Sq Epi: x / Non Sq Epi: x / Bacteria: x              MEDICATIONS:  MEDICATIONS  (STANDING):  levothyroxine 100 MICROGram(s) Oral daily  losartan 25 milliGRAM(s) Oral daily    MEDICATIONS  (PRN):  acetaminophen     Tablet .. 650 milliGRAM(s) Oral every 6 hours PRN Temp greater or equal to 38C (100.4F), Mild Pain (1 - 3)  ibuprofen  Tablet. 400 milliGRAM(s) Oral three times a day PRN Mild Pain (1 - 3), Moderate Pain (4 - 6)  meclizine 25 milliGRAM(s) Oral every 6 hours PRN Dizziness  melatonin 3 milliGRAM(s) Oral at bedtime PRN Insomnia  ondansetron Injectable 4 milliGRAM(s) IV Push every 8 hours PRN Nausea and/or Vomiting

## 2023-08-02 NOTE — DISCHARGE NOTE PROVIDER - HOSPITAL COURSE
HPI Objective Statement: 78F with PMH Hypothyroidism who presents to ED with room-spinning sensation/dizziness and headache x 2 days noted with levated blood presuree in the er sbp>200 pt received hydralazine and was started on losartan 25 day , transthoracic echo performed and Summary:   1. Normal global left ventricular systolic function.   2. Trace mitral and aortic valve regurgitation.   3. Thickening and calcification of the posterior mitral valve leaflet.  cardiology was consulted and pt was started /comntinued on losartan 25 and cleared for discharge 78 year old female with a PMH Hypothyroidism presents to ED with a 2 day history of dizziness, headache, found to be in hypertensive urgency with SBP>200, responded to hydralazine.  Labs unremarkable. CT-Head: Age-appropriate involutional changes. No acute intracranial pathology. BP now well controlled, seen by cardiology.  TTE with normal systolic function.     # Hypertensive urgency - BP now controlled.  Continue Losartan.  Discussed f/u with PMD for further titration as outpatient.  Now asymptomatic.  # Dizziness - CT head unremarkable.  Symptoms have resolved.    # Acquired Hypothyroidism - continue Synthroid.     Stable for d/c home.   Pt and  in agreement.     Disposition: Stable for discharge.  Outpatient followup discussed.  Total time spent on discharge is  35  minutes.

## 2023-08-02 NOTE — DISCHARGE NOTE NURSING/CASE MANAGEMENT/SOCIAL WORK - PATIENT PORTAL LINK FT
You can access the FollowMyHealth Patient Portal offered by [facility] by registering at the following website: [email or web address]. By joining [employer]’s FollowMyHealth portal, you will also be able to view your health information using other applications (apps) compatible with our system.

## 2023-08-02 NOTE — PROGRESS NOTE ADULT - ASSESSMENT
78 year old female with a PMH Hypothyroidism presents to ED with a 2 day history of dizziness, headache, found to be in hypertensive urgency with SBP>200, responded to hydralazine.  Labs unremarkable. CT-Head: Age-appropriate involutional changes. No acute intracranial pathology. BP now well controlled, seen by cardiology.  TTE with normal systolic function.     # Hypertensive urgency - BP now controlled.  Continue Losartan.  Discussed f/u with PMD for further titration as outpatient.  Now asymptomatic.  # Dizziness - CT head unremarkable.  Symptoms have resolved.    # Acquired Hypothyroidism - continue Synthroid.     Stable for d/c home.   Pt and  in agreement.

## 2023-08-02 NOTE — DISCHARGE NOTE PROVIDER - CARE PROVIDER_API CALL
Landry Sultnaa  Internal Medicine  [address], [address]  [place] [id number]  Phone: [phone]  Fax: [phone]  Follow Up Time:

## 2023-08-02 NOTE — PROGRESS NOTE ADULT - SUBJECTIVE AND OBJECTIVE BOX
Patient is a 78y old Female who presents with a chief complaint of dizziness and headache.     PAST MEDICAL & SURGICAL HISTORY:  Hypothyroidism    History of thyroid surgery    INTERVAL HISTORY: Patient is resting comfortably, in no acute distress. Pt is anxious to go home, she reports her symptoms have resolved. Denies headache, dizziness, chest pain, SOB.   	  MEDICATIONS:  MEDICATIONS  (STANDING):  levothyroxine 100 MICROGram(s) Oral daily  losartan 25 milliGRAM(s) Oral daily    MEDICATIONS  (PRN):  acetaminophen     Tablet .. 650 milliGRAM(s) Oral every 6 hours PRN Temp greater or equal to 38C (100.4F), Mild Pain (1 - 3)  ibuprofen  Tablet. 400 milliGRAM(s) Oral three times a day PRN Mild Pain (1 - 3), Moderate Pain (4 - 6)  meclizine 25 milliGRAM(s) Oral every 6 hours PRN Dizziness  melatonin 3 milliGRAM(s) Oral at bedtime PRN Insomnia  ondansetron Injectable 4 milliGRAM(s) IV Push every 8 hours PRN Nausea and/or Vomiting    Vitals:  T(F): 98.1 (08-02-23 @ 11:05), Max: 98.1 (08-01-23 @ 16:49)  HR: 83 (08-02-23 @ 11:05) (71 - 88)  BP: 119/77 (08-02-23 @ 11:05) (108/69 - 138/82)  RR: 17 (08-02-23 @ 11:05) (17 - 17)  SpO2: 96% (08-02-23 @ 11:05) (96% - 100%)    08-01 @ 07:01  -  08-02 @ 07:00  --------------------------------------------------------  IN:    Oral Fluid: 480 mL  Total IN: 480 mL    OUT:  Total OUT: 0 mL    Total NET: 480 mL    Weight (kg): 93 (07-31 @ 16:30)  BMI (kg/m2): 34.1 (07-31 @ 16:30)    PHYSICAL EXAM:  Neuro: Awake, responsive  CV: S1 S2 RRR  Lungs: CTABL  GI: Soft, BS +, ND, NT  Extremities: No edema    RADIOLOGY:   < from: Xray Chest 1 View- PORTABLE-Urgent (Xray Chest 1 View- PORTABLE-Urgent .) (07.31.23 @ 19:01) >  INTERPRETATION:    Heart size and the mediastinum cannot be accurately evaluated on this   projection.  There are low lung volumes.  No focal lung consolidation, pleural effusion, or pneumothorax is seen.  There is osteoarthritic degenerative change of the spine.    IMPRESSION:  Low lung volumes.  No focal lung consolidation.    < end of copied text >    < from: CT Head No Cont (07.31.23 @ 18:48) >  FINDINGS:  Ventricles and sulci: Parenchymal volume loss is present which is   commensurate with patient age.  Intra-axial: No intracranial mass, acute hemorrhage, or midline shift is   present.  Extra-axial: No extra-axial fluid collection is identified.  Calvarium: Intact. Deposition of calcified plaques in association with   the distal intracranial bilateral vertebral arteries and bilateral   carotid siphons.    Bilateral optic globes are intact. Evidence of prior left paranasal sinus   surgery. Bilateral ethmoid air cell mild mucosal thickening. No paranasal   sinus air-fluid levels. Bilateral mastoid air cells and middle ear   cavities are clear. Paranasal sinuses, bilateral mastoid air cells,   middle ear cavities are clear.    IMPRESSION: Age-appropriate involutional changes. No acute intracranial   pathology. If dizziness persists, consider further evaluation via MR   imaging to include DWI and ADC mapping techniques, provided there are no   contraindications.    < end of copied text >    DIAGNOSTIC TESTING:    [p] Echocardiogram:     LABS:    CARDIAC MARKERS:  Troponin I, High Sensitivity Result: 6.2 ng/L (07-31 @ 19:53)    01 Aug 2023 06:26    140    |  111    |  14     ----------------------------<  113    3.7     |  22     |  0.71   31 Jul 2023 19:53    141    |  108    |  18     ----------------------------<  91     3.5     |  28     |  0.96     Ca    7.9        01 Aug 2023 06:26    TPro  6.9    /  Alb  3.2    /  TBili  0.5    /  DBili  x      /  AST  12     /  ALT  13     /  AlkPhos  80     01 Aug 2023 06:26                       13.7   10.22 )-----------( 200      ( 01 Aug 2023 06:26 )             41.5 ,                       13.8   8.24  )-----------( 198      ( 31 Jul 2023 19:53 )             42.1   Lipid Profile: 08-01 @ 06:26  HDL Chol:              71 mg/dL  Serum Chol:            180 mg/dL  Triglycerides:         58 mg/dL    TSH: Thyroid Stimulating Hormone, Serum: 1.210 uIU/mL (07-31 @ 19:53)               Patient is a 78y old Female who presents with a chief complaint of dizziness and headache.     PAST MEDICAL & SURGICAL HISTORY:  Hypothyroidism    History of thyroid surgery    INTERVAL HISTORY: Patient is resting comfortably, in no acute distress. Pt is anxious to go home, she reports her symptoms have resolved. Denies headache, dizziness, chest pain, SOB.   	  MEDICATIONS:  MEDICATIONS  (STANDING):  levothyroxine 100 MICROGram(s) Oral daily  losartan 25 milliGRAM(s) Oral daily    MEDICATIONS  (PRN):  acetaminophen     Tablet .. 650 milliGRAM(s) Oral every 6 hours PRN Temp greater or equal to 38C (100.4F), Mild Pain (1 - 3)  ibuprofen  Tablet. 400 milliGRAM(s) Oral three times a day PRN Mild Pain (1 - 3), Moderate Pain (4 - 6)  meclizine 25 milliGRAM(s) Oral every 6 hours PRN Dizziness  melatonin 3 milliGRAM(s) Oral at bedtime PRN Insomnia  ondansetron Injectable 4 milliGRAM(s) IV Push every 8 hours PRN Nausea and/or Vomiting    Vitals:  T(F): 98.1 (08-02-23 @ 11:05), Max: 98.1 (08-01-23 @ 16:49)  HR: 83 (08-02-23 @ 11:05) (71 - 88)  BP: 119/77 (08-02-23 @ 11:05) (108/69 - 138/82)  RR: 17 (08-02-23 @ 11:05) (17 - 17)  SpO2: 96% (08-02-23 @ 11:05) (96% - 100%)    08-01 @ 07:01  -  08-02 @ 07:00  --------------------------------------------------------  IN:    Oral Fluid: 480 mL  Total IN: 480 mL    OUT:  Total OUT: 0 mL    Total NET: 480 mL    Weight (kg): 93 (07-31 @ 16:30)  BMI (kg/m2): 34.1 (07-31 @ 16:30)    PHYSICAL EXAM:  Neuro: Awake, responsive  CV: S1 S2 RRR  Lungs: CTABL  GI: Soft, BS +, ND, NT  Extremities: No edema    RADIOLOGY:   < from: Xray Chest 1 View- PORTABLE-Urgent (Xray Chest 1 View- PORTABLE-Urgent .) (07.31.23 @ 19:01) >  INTERPRETATION:    Heart size and the mediastinum cannot be accurately evaluated on this   projection.  There are low lung volumes.  No focal lung consolidation, pleural effusion, or pneumothorax is seen.  There is osteoarthritic degenerative change of the spine.    IMPRESSION:  Low lung volumes.  No focal lung consolidation.    < end of copied text >    < from: CT Head No Cont (07.31.23 @ 18:48) >  FINDINGS:  Ventricles and sulci: Parenchymal volume loss is present which is   commensurate with patient age.  Intra-axial: No intracranial mass, acute hemorrhage, or midline shift is   present.  Extra-axial: No extra-axial fluid collection is identified.  Calvarium: Intact. Deposition of calcified plaques in association with   the distal intracranial bilateral vertebral arteries and bilateral   carotid siphons.    Bilateral optic globes are intact. Evidence of prior left paranasal sinus   surgery. Bilateral ethmoid air cell mild mucosal thickening. No paranasal   sinus air-fluid levels. Bilateral mastoid air cells and middle ear   cavities are clear. Paranasal sinuses, bilateral mastoid air cells,   middle ear cavities are clear.    IMPRESSION: Age-appropriate involutional changes. No acute intracranial   pathology. If dizziness persists, consider further evaluation via MR   imaging to include DWI and ADC mapping techniques, provided there are no   contraindications.    < end of copied text >    DIAGNOSTIC TESTING:    [x] Echocardiogram: < from: TTE Echo Complete w/o Contrast w/ Doppler (08.01.23 @ 17:00) >  Left Ventricle: Normal left ventricular size and wall thicknesses, with   normal systolic and diastolic function.  Global LV systolic function was normal.  Right Ventricle: Normal right ventricular size and function.  Left Atrium: The left atrium is normal in size.  Right Atrium: The right atrium is normal in size.  Pericardium: There is no evidence of pericardial effusion.  Mitral Valve: Structurally normal mitral valve, with normal leaflet   excursion. Thickening and calcification of the posterior mitral valve   leaflet. Trace mitral valve regurgitation is seen.  Tricuspid Valve: Structurally normal tricuspid valve, with normal leaflet   excursion. No tricuspid regurgitation is visualized.  Aortic Valve: Normal trileaflet aortic valve with normal opening. Trivial   aortic valve regurgitation is seen.  Pulmonic Valve: The pulmonic valve was not well visualized. No indication   of pulmonic valve regurgitation.  Aorta: The aortic root and ascending aorta are structurally normal, with   no evidence of dilitation.  Pulmonary Artery: The main pulmonary artery is normal in size.      Summary:   1. Normal global left ventricular systolic function.   2. Trace mitral and aortic valve regurgitation.   3. Thickening and calcification of the posterior mitral valve leaflet.    < end of copied text >    LABS:    CARDIAC MARKERS:  Troponin I, High Sensitivity Result: 6.2 ng/L (07-31 @ 19:53)    01 Aug 2023 06:26    140    |  111    |  14     ----------------------------<  113    3.7     |  22     |  0.71   31 Jul 2023 19:53    141    |  108    |  18     ----------------------------<  91     3.5     |  28     |  0.96     Ca    7.9        01 Aug 2023 06:26    TPro  6.9    /  Alb  3.2    /  TBili  0.5    /  DBili  x      /  AST  12     /  ALT  13     /  AlkPhos  80     01 Aug 2023 06:26                       13.7   10.22 )-----------( 200      ( 01 Aug 2023 06:26 )             41.5 ,                       13.8   8.24  )-----------( 198      ( 31 Jul 2023 19:53 )             42.1     Lipid Profile: 08-01 @ 06:26  HDL Chol:              71 mg/dL  Serum Chol:            180 mg/dL  Triglycerides:         58 mg/dL    TSH: Thyroid Stimulating Hormone, Serum: 1.210 uIU/mL (07-31 @ 19:53)

## 2023-08-02 NOTE — DISCHARGE NOTE PROVIDER - NSDCFUADDINST_GEN_ALL_CORE_FT
It is important to see your primary physician as well as any specialty physicians within the next week to perform a comprehensive medical review.  Call their offices for an appointment as soon as you leave the hospital.  You will also need to see them for renewal of your medications.  If have any difficulty following with a physician, contact the [facility] [phone] or via [email or web address].   To obtain your results, you can access the [email or web address] Patient Portal at [email or web address].  Your medical issues appear to be stable at this time, but if your symptoms recur or worsen, contact your physicians and/or return to the hospital if necessary.  If you encounter any issues or questions with your medication, call your physicians before stopping the medication.  Do not drive.  Limit your diet to 2 grams of sodium daily.

## 2023-08-02 NOTE — PROGRESS NOTE ADULT - ASSESSMENT
78 year old female with a PMH Hypothyroidism; presented to ED with a 2 day history of dizziness, HA, nausea.  In the ED patient was found to be hypertensive with SBP >200, responded to hydralazine.   Labs unremarkable.   CT-Head: Age-appropriate involutional changes. No acute intracranial pathology.   No hx of HTN... usually 110-120s.  Similar presentation a few years ago... normal stress test/echo; ?stress related.  EKG: sinus 64bpm  On hydralazine 10mg IV q8    -cont losartan 25mg daily   -2D echo pending  -can d/c tele if echo is normal  78 year old female with a PMH Hypothyroidism; presented to ED with a 2 day history of dizziness, HA, nausea.  In the ED patient was found to be hypertensive with SBP >200, responded to hydralazine.   Labs unremarkable.   CT-Head: Age-appropriate involutional changes. No acute intracranial pathology.   No hx of HTN, usually 110-120s.  Similar presentation a few years ago, normal stress test/echo; ?stress related.  EKG: sinus 64bpm    -cont losartan 25mg daily, BP stable now  -2D echo unremarkable  -can be discharged from cardiac standpoint, outpatient cardiology follow up within 1-2 weeks    98

## 2023-08-02 NOTE — DISCHARGE NOTE PROVIDER - NSDCMRMEDTOKEN_GEN_ALL_CORE_FT
calcium carbonate 500 mg (200 mg elemental calcium) oral tablet, chewable: 1 tab(s) chewed once a day  levothyroxine 100 mcg (0.1 mg) oral tablet: 1 tab(s) orally once a day  losartan 25 mg oral tablet: 1 tab(s) orally once a day  Rocaltrol 0.25 mcg oral capsule: 1 cap(s) orally once a day

## 2023-08-04 DIAGNOSIS — E03.9 HYPOTHYROIDISM, UNSPECIFIED: ICD-10-CM

## 2023-08-04 DIAGNOSIS — R42 DIZZINESS AND GIDDINESS: ICD-10-CM

## 2023-08-04 DIAGNOSIS — I16.0 HYPERTENSIVE URGENCY: ICD-10-CM

## 2023-08-04 DIAGNOSIS — Z79.890 HORMONE REPLACEMENT THERAPY: ICD-10-CM

## 2023-08-04 DIAGNOSIS — Z88.1 ALLERGY STATUS TO OTHER ANTIBIOTIC AGENTS STATUS: ICD-10-CM

## 2023-08-04 DIAGNOSIS — I10 ESSENTIAL (PRIMARY) HYPERTENSION: ICD-10-CM

## 2024-01-12 NOTE — PATIENT PROFILE ADULT - ARRIVAL FROM
Patient reports history of elevated platelets and white blood cells. She had unremarkable workup per her recounting by Rheumatology and Hematology. Will monitor levels in labs as ordered.   Home

## 2024-05-26 ENCOUNTER — EMERGENCY (EMERGENCY)
Facility: HOSPITAL | Age: 79
LOS: 0 days | Discharge: HOME | End: 2024-05-26
Attending: STUDENT IN AN ORGANIZED HEALTH CARE EDUCATION/TRAINING PROGRAM
Payer: MEDICARE

## 2024-05-26 VITALS
WEIGHT: 210.1 LBS | HEART RATE: 71 BPM | DIASTOLIC BLOOD PRESSURE: 73 MMHG | OXYGEN SATURATION: 98 % | HEIGHT: 65 IN | SYSTOLIC BLOOD PRESSURE: 128 MMHG | RESPIRATION RATE: 16 BRPM | TEMPERATURE: 98 F

## 2024-05-26 VITALS
RESPIRATION RATE: 18 BRPM | DIASTOLIC BLOOD PRESSURE: 81 MMHG | HEART RATE: 62 BPM | SYSTOLIC BLOOD PRESSURE: 154 MMHG | OXYGEN SATURATION: 98 % | TEMPERATURE: 97 F

## 2024-05-26 DIAGNOSIS — M79.641 PAIN IN RIGHT HAND: ICD-10-CM

## 2024-05-26 DIAGNOSIS — Z23 ENCOUNTER FOR IMMUNIZATION: ICD-10-CM
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
## 2024-05-26 DIAGNOSIS — Z98.890 OTHER SPECIFIED POSTPROCEDURAL STATES: Chronic | ICD-10-CM

## 2024-05-26 DIAGNOSIS — M25.531 PAIN IN RIGHT WRIST: ICD-10-CM
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
## 2024-05-26 PROCEDURE — 73090 X-RAY EXAM OF FOREARM: CPT | Mod: 26,RT

## 2024-05-26 PROCEDURE — 99284 EMERGENCY DEPT VISIT MOD MDM: CPT | Mod: 25

## 2024-05-26 PROCEDURE — 73130 X-RAY EXAM OF HAND: CPT | Mod: 26,RT

## 2024-05-26 PROCEDURE — 73110 X-RAY EXAM OF WRIST: CPT | Mod: 26,RT

## 2024-05-26 PROCEDURE — 29125 APPL SHORT ARM SPLINT STATIC: CPT | Mod: RT

## 2024-05-26 PROCEDURE — 73564 X-RAY EXAM KNEE 4 OR MORE: CPT | Mod: 26,LT

## 2024-05-26 RX ORDER — TETANUS TOXOID, REDUCED DIPHTHERIA TOXOID AND ACELLULAR PERTUSSIS VACCINE, ADSORBED 5; 2.5; 8; 8; 2.5 [IU]/.5ML; [IU]/.5ML; UG/.5ML; UG/.5ML; UG/.5ML
0.5 SUSPENSION INTRAMUSCULAR ONCE
Refills: 0 | Status: COMPLETED | OUTPATIENT
Start: 2024-05-26 | End: 2024-05-26

## 2024-05-26 RX ADMIN — TETANUS TOXOID, REDUCED DIPHTHERIA TOXOID AND ACELLULAR PERTUSSIS VACCINE, ADSORBED 0.5 MILLILITER(S): 5; 2.5; 8; 8; 2.5 SUSPENSION INTRAMUSCULAR at 17:26

## 2024-05-26 NOTE — ED PROVIDER NOTE - NSFOLLOWUPINSTRUCTIONS_ED_ALL_ED_FT
keep splint on until you are seen by orthopedic doctor  can take ibuprofen over the counter for pain as needed.     return if symptoms worsen, numbness/tingling, fever or chills, changes in sensation/color of fingertips

## 2024-05-26 NOTE — ED PROVIDER NOTE - OBJECTIVE STATEMENT
78 y/o F presents w/ R hand/wrist pain s/p fall. states she tripped over uneven concrete outside and landed onto R hand. endorsing pain on palmar aspect of the R hand/wrist. unknown tetanus status. denies striking head/neck. endorsing hitting L knee but able to ambulate.

## 2024-05-26 NOTE — ED PROVIDER NOTE - CARE PROVIDER_API CALL
Shana Babb  Orthopaedic Surgery  [address], [address]  [place] [address]  Phone: [phone]  Fax: [phone]  Follow Up Time: 4-6 Days    Ramón Rose  Plastic Surgery  [address], [address]  [place] [address]  Phone: [phone]  Fax: [phone]  Follow Up Time: 4-6 Days

## 2024-05-26 NOTE — ED ADULT NURSE NOTE - NSFALLHARMRISKINTERV_ED_ALL_ED

## 2024-05-26 NOTE — ED PROVIDER NOTE - PHYSICAL EXAMINATION
General: Well appearing female in no acute distress  HEENT: Normocephalic, atraumatic. Moist mucous membranes. Oropharynx clear. No lymphadenopathy.  Eyes: No scleral icterus. EOMI. SCAR.  Neck:. Soft and supple. Full ROM without pain. No midline tenderness  Cardiac: Regular rate and regular rhythm. No murmurs, rubs, gallops. Peripheral pulses 2+ and symmetric. No LE edema.  Resp: Lungs CTAB. Speaking in full sentences. No wheezes, rales or rhonchi.  Abd: Soft, non-tender, non-distended. No guarding or rebound. No scars, masses, or lesions.  Back: Spine midline and non-tender. No CVA tenderness.    Skin: abrasion to the L knee, full ROM of the L knee. tenderness over palmar aspect of hand near base of the 1st digit/distal radius. able to make closed fist, thumbs up sign, OK sign. neurovascularly intact RUE, sensation intact. R hand dominant. mild tenderness over the R wrist w/ flexion/extension .  Neuro: AO x 3. Moves all extremities symmetrically. Motor strength and sensation grossly intact.

## 2024-05-26 NOTE — ED PROVIDER NOTE - PATIENT PORTAL LINK FT
You can access the FollowMyHealth Patient Portal offered by [facility] by registering at the following website: [email or web address]. By joining [employer]’s FollowMyHealth portal, you will also be able to view your health information using other applications (apps) compatible with our system.

## 2024-05-26 NOTE — ED ADULT NURSE NOTE - OBJECTIVE STATEMENT
Patient alert and verbally responsive, came in due having a tripped and fell while hanging out laundry out side c/o right wrist and pain , swelling, bruising, left knee pain and abrasions. Took Advil prior to ed arrival.  HX anxiety

## 2024-05-26 NOTE — ED ADULT NURSE NOTE - DISCHARGE DATE/TIME
"Requested Prescriptions   Pending Prescriptions Disp Refills     sertraline (ZOLOFT) 50 MG tablet [Pharmacy Med Name: SERTRALINE HCL 50MG TABS] 90 tablet 1     Sig: TAKE ONE TABLET BY MOUTH ONCE DAILY       SSRIs Protocol Passed - 11/20/2019  8:42 AM        Passed - Recent (12 mo) or future (30 days) visit within the authorizing provider's specialty     Patient has had an office visit with the authorizing provider or a provider within the authorizing providers department within the previous 12 mos or has a future within next 30 days. See \"Patient Info\" tab in inbasket, or \"Choose Columns\" in Meds & Orders section of the refill encounter.              Passed - Medication is active on med list        Passed - Patient is age 18 or older        Passed - No active pregnancy on record        Passed - No positive pregnancy test in last 12 months        Last Written Prescription Date:  3/28/19  Last Fill Quantity: 90,  # refills: 0   Last office visit: No previous visit found with prescribing provider:  Chang   Future Office Visit:      "
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        
26-May-2024 20:12

## 2024-05-26 NOTE — ED ADULT NURSE REASSESSMENT NOTE - NS ED NURSE REASSESS COMMENT FT1
Pt AOX3 with steady gait. Pt reports no acute distress at this time. Pt accepts the d/c from the MD.

## 2024-05-26 NOTE — ED PROVIDER NOTE - CLINICAL SUMMARY MEDICAL DECISION MAKING FREE TEXT BOX
80 y/o F presents w/ R hand/wrist pain s/p fall. states she tripped over uneven concrete outside and landed onto R hand. endorsing pain on palmar aspect of the R hand/wrist. unknown tetanus status. denies striking head/neck. endorsing hitting L knee but able to ambulate.     abrasion to the L knee, full ROM of the L knee. tenderness over palmar aspect of hand near base of the 1st digit/distal radius. able to make closed fist, thumbs up sign, OK sign. neurovascularly intact RUE, sensation intact. R hand dominant. mild tenderness over the R wrist w/ flexion/extension .    pain meds  not on blood thinners.     xrays, reassess. 80 y/o F presents w/ R hand/wrist pain s/p fall. states she tripped over uneven concrete outside and landed onto R hand. endorsing pain on palmar aspect of the R hand/wrist. unknown tetanus status. denies striking head/neck. endorsing hitting L knee but able to ambulate.     abrasion to the L knee, full ROM of the L knee. tenderness over palmar aspect of hand near base of the 1st digit/distal radius. able to make closed fist, thumbs up sign, OK sign. neurovascularly intact RUE, sensation intact. R hand dominant. mild tenderness over the R wrist w/ flexion/extension .    pain meds  not on blood thinners.     xrays, reassess.  thumb spica splint palced over R hand due to tenderness near base of 1st digit palmar region

## 2024-05-26 NOTE — ED PROVIDER NOTE - PROVIDER TOKENS
PROVIDER:[TOKEN:[[id number]],FOLLOWUP:[4-6 Days]],PROVIDER:[TOKEN:[[id number]],FOLLOWUP:[4-6 Days]]

## 2024-05-26 NOTE — ED ADULT TRIAGE NOTE - CHIEF COMPLAINT QUOTE
Tripped and fell while hanging out laundry out side c/o right hand and thumb  pain , swelling, bruising, left knee pain and abrasions. Took Advil prior to ed arrival.  HX anxiety

## 2024-05-27 PROBLEM — E03.9 HYPOTHYROIDISM, UNSPECIFIED: Chronic | Status: ACTIVE | Noted: 2023-07-31

## 2024-05-28 ENCOUNTER — APPOINTMENT (OUTPATIENT)
Dept: ORTHOPEDIC SURGERY | Facility: CLINIC | Age: 79
End: 2024-05-28
Payer: MEDICARE

## 2024-05-28 VITALS — WEIGHT: 210 LBS | HEIGHT: 65 IN | BODY MASS INDEX: 34.99 KG/M2

## 2024-05-28 PROCEDURE — L3807: CPT | Mod: RT

## 2024-05-28 PROCEDURE — 99203 OFFICE O/P NEW LOW 30 MIN: CPT | Mod: 25

## 2024-05-28 NOTE — IMAGING
[de-identified] : Right hand with volar swelling and ecchymosis to the wrist. ttp over mcp joint RCL> UCL there is no ligamentous laxity noted no snuffbox ttp. wrist with pain free ROM. strength: pt is guarding.  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  no fracture noted.  Left knee with minimal swelling anterior skin abrasion w/o evidence of infection. no ligamentous laxity minimal medial jointline ttp Spring Sign is negative pain free ROM gait is nml LLE is nvi.   Left knee [facility] 3 view xray shows no acute bony pathology/ medial joint moderate oa and ptf oa.

## 2024-05-28 NOTE — HISTORY OF PRESENT ILLNESS
[6] : 6 [Dull/Aching] : dull/aching [Sharp] : sharp [de-identified] :  05/28/2024 :ARIANA CHÁVEZ , a 79 year old female, presents today for right hand and left knee pain.  Pt was treated at [facility] at [place] and pt was informed she has no fracture.  PMH: HTN, Anxiety. Allergies: Ceclor (rash), Neomycin (SOB), Pain Medication (severe nausea).    [] : no [de-identified] : Glen

## 2024-05-28 NOTE — ASSESSMENT
[FreeTextEntry1] : The patient was advised of the diagnosis. The natural history of the pathology was explained in full to the patient in layman's terms. All questions were answered. The risks and benefits of surgical and non-surgical treatment alternatives were explained in full to the patient.  Pt provided right Gamekeeper brace x 4 weeks. knee abrasion cleansed and bandaged  Pt will rto in 4 weeks for f/u care.  PRN otc Motrin for discomfort.  NSAIDs recommended.  Patient warned of risk of NSAID medication to stomach and GI tract, risk of increase blood pressure, cardiac risk, and risk of fluid retention.  The patient should clear taking medication with internist/PMD if any problem with heart, blood pressure, or GI system exists.

## 2024-06-25 ENCOUNTER — APPOINTMENT (OUTPATIENT)
Dept: ORTHOPEDIC SURGERY | Facility: CLINIC | Age: 79
End: 2024-06-25
Payer: MEDICARE

## 2024-06-25 DIAGNOSIS — M19.041 PRIMARY OSTEOARTHRITIS, RIGHT HAND: ICD-10-CM

## 2024-06-25 DIAGNOSIS — M70.52 OTHER BURSITIS OF KNEE, LEFT KNEE: ICD-10-CM

## 2024-06-25 DIAGNOSIS — S63.641A SPRAIN OF METACARPOPHALANGEAL JOINT OF RIGHT THUMB, INITIAL ENCOUNTER: ICD-10-CM

## 2024-06-25 DIAGNOSIS — M17.12 UNILATERAL PRIMARY OSTEOARTHRITIS, LEFT KNEE: ICD-10-CM

## 2024-06-25 PROCEDURE — 20551 NJX 1 TENDON ORIGIN/INSJ: CPT | Mod: LT

## 2024-06-25 PROCEDURE — 99213 OFFICE O/P EST LOW 20 MIN: CPT | Mod: 25

## 2024-07-10 NOTE — ED PROVIDER NOTE - WR ORDER DATE AND TIME 3
Last appt: 11/16/2022    Next appt:10/5/23  cancelled    Medication matches medication on Epic list    26-May-2024 17:06

## 2024-07-23 ENCOUNTER — APPOINTMENT (OUTPATIENT)
Dept: ORTHOPEDIC SURGERY | Facility: CLINIC | Age: 79
End: 2024-07-23
Payer: MEDICARE

## 2024-07-23 VITALS — WEIGHT: 210 LBS | BODY MASS INDEX: 34.99 KG/M2 | HEIGHT: 65 IN

## 2024-07-23 DIAGNOSIS — S63.641A SPRAIN OF METACARPOPHALANGEAL JOINT OF RIGHT THUMB, INITIAL ENCOUNTER: ICD-10-CM

## 2024-07-23 DIAGNOSIS — M19.041 PRIMARY OSTEOARTHRITIS, RIGHT HAND: ICD-10-CM

## 2024-07-23 PROCEDURE — 99213 OFFICE O/P EST LOW 20 MIN: CPT

## 2024-07-23 NOTE — IMAGING
[de-identified] : Right hand with volar swelling and ecchymosis to the wrist. ttp over mcp joint UCL> RCL there is no ligamentous laxity noted no snuffbox ttp. wrist with pain free ROM. strength: pt is guarding.   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  no fracture noted.

## 2024-07-23 NOTE — HISTORY OF PRESENT ILLNESS
[6] : 6 [Dull/Aching] : dull/aching [Sharp] : sharp [de-identified] : 7/23/24:  Pt has been wearing gamekeeper and reports that she still has pain.  Knee is doing well.  06/25/2024 :ARIANA CHÁVEZ , a 79 year old female, presents today for - Right hand, left knee pain.    05/28/2024 :ARIANA CHÁVEZ , a 79 year old female, presents today for right hand and left knee pain.  Pt was treated at [facility] at [place] and pt was informed she has no fracture.  PMH: HTN, Anxiety. Allergies: Ceclor (rash), Neomycin (SOB), Pain Medication (severe nausea).    [] : no [de-identified] : Glen

## 2024-07-23 NOTE — ASSESSMENT
[FreeTextEntry1] : The patient was advised of the diagnosis. The natural history of the pathology was explained in full to the patient in layman's terms. All questions were answered. The risks and benefits of surgical and non-surgical treatment alternatives were explained in full to the patient.  C/w gamekeeper F/u in 4 weeks

## 2024-08-20 ENCOUNTER — APPOINTMENT (OUTPATIENT)
Dept: ORTHOPEDIC SURGERY | Facility: CLINIC | Age: 79
End: 2024-08-20
Payer: MEDICARE

## 2024-08-20 DIAGNOSIS — S63.641A SPRAIN OF METACARPOPHALANGEAL JOINT OF RIGHT THUMB, INITIAL ENCOUNTER: ICD-10-CM

## 2024-08-20 DIAGNOSIS — M18.11 UNILATERAL PRIMARY OSTEOARTHRITIS OF FIRST CARPOMETACARPAL JOINT, RIGHT HAND: ICD-10-CM

## 2024-08-20 PROCEDURE — 99213 OFFICE O/P EST LOW 20 MIN: CPT

## 2024-08-20 NOTE — ASSESSMENT
[FreeTextEntry1] : The patient was advised of the diagnosis. The natural history of the pathology was explained in full to the patient in layman's terms. All questions were answered. The risks and benefits of surgical and non-surgical treatment alternatives were explained in full to the patient.    Start OT F/u in 4 weeks
No

## 2024-08-20 NOTE — HISTORY OF PRESENT ILLNESS
[6] : 6 [Dull/Aching] : dull/aching [Sharp] : sharp [de-identified] : 8/20/24: Pt reports right wrist pain   7/23/24:  Pt has been wearing gamekeeper and reports that she still has pain.  Knee is doing well.  06/25/2024 :ARIANA CHÁVEZ , a 79 year old female, presents today for - Right hand, left knee pain.    05/28/2024 :ARIANA CHÁVEZ , a 79 year old female, presents today for right hand and left knee pain.  Pt was treated at [facility] at [place] and pt was informed she has no fracture.  PMH: HTN, Anxiety. Allergies: Ceclor (rash), Neomycin (SOB), Pain Medication (severe nausea).    [] : no [de-identified] : Glen

## 2024-08-20 NOTE — IMAGING
[de-identified] : Right hand without volar swelling nor ecchymosis to the wrist. no ttp over mcp joint UCL> RCL there is no ligamentous laxity noted no snuffbox ttp. wrist with pain free ROM. strength: pt is guarding.

## 2024-10-01 ENCOUNTER — APPOINTMENT (OUTPATIENT)
Dept: ORTHOPEDIC SURGERY | Facility: CLINIC | Age: 79
End: 2024-10-01
Payer: MEDICARE

## 2024-10-01 DIAGNOSIS — M18.11 UNILATERAL PRIMARY OSTEOARTHRITIS OF FIRST CARPOMETACARPAL JOINT, RIGHT HAND: ICD-10-CM

## 2024-10-01 DIAGNOSIS — M65.311 TRIGGER THUMB, RIGHT THUMB: ICD-10-CM

## 2024-10-01 PROCEDURE — 99213 OFFICE O/P EST LOW 20 MIN: CPT

## 2024-10-01 NOTE — IMAGING
[de-identified] : Right hand without volar swelling nor ecchymosis to the wrist. no ttp over mcp joint  there is no ligamentous laxity noted minimal ttp over the right thumb A1 pulley with no triggering. mild thumb stiffness. no snuffbox ttp. wrist with pain free ROM. strength: pt is guarding.

## 2024-10-01 NOTE — ASSESSMENT
[FreeTextEntry1] : The patient was advised of the diagnosis. The natural history of the pathology was explained in full to the patient in layman's terms. All questions were answered. The risks and benefits of surgical and non-surgical treatment alternatives were explained in full to the patient.    continue OT x 6 weeks, as pt has noted improvement.   CSI to right trigger thumb declined today.   F/u in 6 weeks

## 2024-10-01 NOTE — HISTORY OF PRESENT ILLNESS
[6] : 6 [Dull/Aching] : dull/aching [Sharp] : sharp [de-identified] : 10/1/2024:  (Previous right hand xrays on [facility] 5/26/2024)    8/20/24: Pt reports right wrist pain has improved with formal OT.   7/23/24:  Pt has been wearing gamekeeper and reports that she still has pain.  Knee is doing well.  06/25/2024 :ARIANA CHÁVEZ , a 79 year old female, presents today for - Right hand, left knee pain.    05/28/2024 :ARIANA CHÁVEZ , a 79 year old female, presents today for right hand and left knee pain.  Pt was treated at [facility] and pt was informed she has no fracture.  PMH: HTN, Anxiety. Allergies: Ceclor (rash), Neomycin (SOB), Pain Medication (severe nausea).    [] : no [FreeTextEntry5] : pain has improved with PT. [de-identified] : Glen

## 2024-11-12 ENCOUNTER — APPOINTMENT (OUTPATIENT)
Dept: ORTHOPEDIC SURGERY | Facility: CLINIC | Age: 79
End: 2024-11-12
Payer: MEDICARE

## 2024-11-12 DIAGNOSIS — M17.12 UNILATERAL PRIMARY OSTEOARTHRITIS, LEFT KNEE: ICD-10-CM

## 2024-11-12 DIAGNOSIS — M16.12 UNILATERAL PRIMARY OSTEOARTHRITIS, LEFT HIP: ICD-10-CM

## 2024-11-12 DIAGNOSIS — M18.11 UNILATERAL PRIMARY OSTEOARTHRITIS OF FIRST CARPOMETACARPAL JOINT, RIGHT HAND: ICD-10-CM

## 2024-11-12 PROCEDURE — 73562 X-RAY EXAM OF KNEE 3: CPT | Mod: LT

## 2024-11-12 PROCEDURE — 73502 X-RAY EXAM HIP UNI 2-3 VIEWS: CPT

## 2024-11-12 PROCEDURE — 99213 OFFICE O/P EST LOW 20 MIN: CPT
